# Patient Record
Sex: FEMALE | Race: OTHER | HISPANIC OR LATINO
[De-identification: names, ages, dates, MRNs, and addresses within clinical notes are randomized per-mention and may not be internally consistent; named-entity substitution may affect disease eponyms.]

---

## 2021-12-02 ENCOUNTER — APPOINTMENT (OUTPATIENT)
Dept: OBGYN | Facility: CLINIC | Age: 36
End: 2021-12-02

## 2022-05-02 ENCOUNTER — EMERGENCY (EMERGENCY)
Facility: HOSPITAL | Age: 37
LOS: 1 days | Discharge: HOME | End: 2022-05-02
Admitting: EMERGENCY MEDICINE
Payer: COMMERCIAL

## 2022-05-02 VITALS
OXYGEN SATURATION: 100 % | TEMPERATURE: 98 F | RESPIRATION RATE: 18 BRPM | HEART RATE: 88 BPM | DIASTOLIC BLOOD PRESSURE: 103 MMHG | SYSTOLIC BLOOD PRESSURE: 150 MMHG

## 2022-05-02 DIAGNOSIS — F43.21 ADJUSTMENT DISORDER WITH DEPRESSED MOOD: ICD-10-CM

## 2022-05-02 PROCEDURE — 99284 EMERGENCY DEPT VISIT MOD MDM: CPT
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
## 2022-05-02 NOTE — ED BEHAVIORAL HEALTH NOTE - BEHAVIORAL HEALTH NOTE
Writer met with patient in [facility] to obtain collateral contact. Patient shared the phone number to her father Pete Reyes ([phone]). Writer contacted the patient’s father with use of  (ID#[id number]). The following information is per patient’s father.    Patient is a 35 YO female domiciled with her 2 children (11 & 7), patient’s father and the father’s wife. As per father patient was on the phone with her therapist when the therapist contacted EMS. Patient attends therapy weekly. The father noticed the patient throwing stuff in the room, but nothing was broken. Father reports the patient locked herself in the room over the weekend and appeared depressed. Patient’s sleep and appetite have been poor. Patient’s hygiene is normal as per father. Father reports no safety concerns for the children and that the patient is able to care for the children. Stressors include problems with the children’s fathers and the oldest daughter acting like a rebel. Father reports the patient took extra of her medication last night but does not believe it was a suicide attempt.  Father denied any drug or alcohol abuse by the patient. Father says patient has one prior suicide attempt 20 years ago by trying to cut herself. Father reports the patient works at a [employer] for [employer] and has been going to work as scheduled. Medical problems include a hx of migraines. Father is hopeful for the patient to be evaluated. No further safety concerns reported for the patient.    Writer met with patient in [facility] to discuss discharge transportation. Patient's insurance is private and would not be eligible for medicaid taxi. Patient reports she is comfortable scheduling an uber for herself and that her aunt from [place] is on the way. Writer inquired about any additional services needed but the patient denied. 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 No further intervention needed.

## 2022-05-02 NOTE — ED PROVIDER NOTE - PATIENT PORTAL LINK FT
You can access the FollowMyHealth Patient Portal offered by [facility] by registering at the following website: [email or web address]. By joining [facility]’s FollowMyHealth portal, you will also be able to view your health information using other applications (apps) compatible with our system.

## 2022-05-02 NOTE — ED BEHAVIORAL HEALTH ASSESSMENT NOTE - SAFETY PLAN ADDT'L DETAILS
Safety plan discussed with.../Education provided regarding environmental safety / lethal means restriction/Provision of National Suicide Prevention Lifeline 2-871-111-ADFI (3877)

## 2022-05-02 NOTE — ED ADULT NURSE NOTE - OBJECTIVE STATEMENT
Pt w/ hx of depression not currently on medications received to  endorsing suicidal ideation without plan.  Pt reports she has a remote history of SI and has not been suicidal for years, however cites recent financial struggles and 11 year old daughter's behavior as stressors stating "she put me over the edge last night."  Pt denies any physical complaints.  Pt p/w NAD breaths even unlabored skin warm dry intact.  belongings checked and secured in  locker 6 per policy

## 2022-05-02 NOTE — ED BEHAVIORAL HEALTH ASSESSMENT NOTE - MODIFICATIONS
I have personally seen and examined this patient with LINSEY Perez. I have fully participated in the care of this patient. I have made amendments to the documentation where appropriate and otherwise agree with the history, mental status exam, and plan as documented by LINSEY Perez

## 2022-05-02 NOTE — ED ADULT TRIAGE NOTE - CHIEF COMPLAINT QUOTE
Pt reporting to the ED for S/I, pmh of depression, not taking medication at this time. . Reports taking sleeping pills quetiapine 50 mg X3 last night, stating "I wish I just never woke up". Pt reporting S/I with no plan, H/I (daughter). No Drug or ETOH use, no auditory or visual hallucinations.

## 2022-05-02 NOTE — ED BEHAVIORAL HEALTH ASSESSMENT NOTE - NSBHROSSYSTEMS_PSY_ALL_CORE
Pt currently denied any headaches, no dizziness, no blurring of vision; no sorethroat; no cough, no fever. no chest pains, no SOB, no palpitations, no abdominal pains, no nausea/ vomiting/ diarrhea, no dysuria, no hesitancy, no arthralgia/ no pruritus. denied any muscle/ joint pains

## 2022-05-02 NOTE — ED BEHAVIORAL HEALTH ASSESSMENT NOTE - DETAILS
hx of NSSIB, past SA via OD in 2012, denies current active intent/plan 11 and 5 y/o daughters Left message for therapist Laura Michael for call back see hpi see  safety plan
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
## 2022-05-02 NOTE — ED BEHAVIORAL HEALTH ASSESSMENT NOTE - RISK ASSESSMENT
Acute risk factors for SI includes depressed mood, past SA/NSSIB.   However she has multiple protective factors which mitigate her risk including no current SIIP/HIIP, no marilu or psychosis, no substance use, good insight, future oriented, engaged in treatment, wants to get better. She is not at acute risk to self or others. Her needs would best be met with outgoing outpatient care. Moderate Acute Suicide Risk Low Acute Suicide Risk

## 2022-05-02 NOTE — ED BEHAVIORAL HEALTH ASSESSMENT NOTE - REFERRAL / APPOINTMENT DETAILS
Will f/u with Laura Michael and consider increasing sessions 2x/week. Has scheduled appointment with psych NP on 5/6/22

## 2022-05-02 NOTE — ED PROVIDER NOTE - CLINICAL SUMMARY MEDICAL DECISION MAKING FREE TEXT BOX
35 y/o female depression c/o suicidal ideation and harmful thoughts against daughter  -psych consult  -dispo as per psychiatry team

## 2022-05-02 NOTE — ED BEHAVIORAL HEALTH ASSESSMENT NOTE - DESCRIPTION
Dysphoric appearing, calm. cooperative.  Vital Signs Last 24 Hrs  T(C): 36.6 (02 May 2022 16:47), Max: 36.6 (02 May 2022 16:47)  T(F): 97.8 (02 May 2022 16:47), Max: 97.8 (02 May 2022 16:47)  HR: 88 (02 May 2022 16:47) (88 - 88)  BP: 150/103 (02 May 2022 16:47) (150/103 - 150/103)  BP(mean): --  RR: 18 (02 May 2022 16:47) (18 - 18)  SpO2: 100% (02 May 2022 16:47) (100% - 100%) see hpi migraines

## 2022-05-02 NOTE — ED PROVIDER NOTE - NSFOLLOWUPINSTRUCTIONS_ED_ALL_ED_FT
You have been given information necessary to follow up with the  [facility] ([facility]) Crisis center & other outpatient  psychiatric clinics within your community    • [facility] walk in Crisis centre  [address] [place] [address] [phone] [email or web address]  Hours of operation:  Day	                                        Hours  Sunday                                  Closed  Monday                                9am - 3pm  Tuesday                                9am - 3pm  Wednesday                          9am - 3pm  Thursday                               9am - 3pm  Friday                                    9am - 3pm  Saturday                                Closed    .....additionally if your current problem is associated with drug or alcohol abuse further information can be obtained at the Drug Abuse Evaluation Health Referral Servce ([facility])    • [facility] [address] [place] [address] [phone] [email or web address]    Additionally if more support and information and help is needed in the area of suicide prevention pleas3 feel free to contact :   • Suicide Prevention [place] [address]  Phone: 4-599-944-EUWM (0710)  Web Address: http://www.suicidepreventionlifeline.org  • Suicide Awareness Voices of Education  [address]  [place], [place]  Phone: 1-805.493.1223  Web Address: http://www.save.org    Depression    WHAT YOU NEED TO KNOW:  Depression is a medical condition that causes feelings of sadness or hopelessness that do not go away. Depression may cause you to lose interest in things you used to enjoy. These feelings may interfere with your daily life.  DISCHARGE INSTRUCTIONS:  Call your local emergency number (911 in the [place]) if:   •You think about harming yourself or someone else.  •You have done something on purpose to hurt yourself.  Call your therapist or doctor if:   •Your symptoms do not improve.  •You cannot make it to your next appointment.   •You have new symptoms.  •You have questions or concerns about your condition or care.  The following resources are available at any time to help you, if needed:   •National Suicide Prevention Lifeline: 1-904.228.6330 (9-621-037-TALK)  •Suicide Hotline: 1-325.918.3918 (1-805-BCISTGL)  •For a list of international numbers: https://save.org/find-help/international-resources/  Medicines:   •Antidepressants may be given to improve or balance your mood. You may need to take this medicine for several weeks before you begin to feel better.  •Take your medicine as directed. Contact your healthcare provider if you think your medicine is not helping or if you have side effects. Tell him of her if you are allergic to any medicine. Keep a list of the medicines, vitamins, and herbs you take. Include the amounts, and when and why you take them. Bring the list or the pill bottles to follow-up visits. Carry your medicine list with you in case of an emergency.  Therapy is often used together with medicine to relieve depression. Therapy is a way for you to talk about your feelings and anything that may be causing depression. Therapy can be done alone or in a group. It may also be done with family members or a significant other.    Self-care:   •Get regular physical activity. Try to be active for 30 minutes, 3 to 5 days a week. Physical activity can help relieve depression. Work with your healthcare provider to develop a plan that you enjoy. It may help to ask someone to be active with you.  •Create a regular sleep schedule. A routine can help you relax before bed. Listen to music, read, or do yoga. Try to go to bed and wake up at the same time every day. Sleep is important for emotional health.  •Eat a variety of healthy foods. Healthy foods include fruits, vegetables, whole-grain breads, low-fat dairy products, lean meats, fish, and cooked beans. A healthy meal plan is low in fat, salt, and added sugar.  •Do not drink alcohol or use drugs. Alcohol and drugs can make depression worse. Talk to your therapist or doctor if you need help quitting.  Follow up with your healthcare provider as directed: Your healthcare provider will monitor your progress at follow-up visits. He or she will also monitor your medicine if you take antidepressants. Your healthcare provider will ask if the medicine is helping. Tell him or her about any side effects or problems you may have with your medicine. The type or amount of medicine may need to be changed. Write down your questions so you remember to ask them during your visits.    Suicide Prevention  WHAT YOU NEED TO KNOW:  You may see suicide as the only way to escape emotional or physical pain and suffering. Help is available from people who care about you, and from professionals trained in suicide prevention. Prevention includes everything you and others can do to stop you from taking your life.  DISCHARGE INSTRUCTIONS:  Call your local emergency number (911 in the ), or ask someone to call if:   •You do something on purpose to hurt yourself  •You make a plan to commit suicide.  Call your doctor or therapist, or have someone close to you call if:   •You act out in anger, are reckless, or are abusing alcohol or drugs.  •You have serious thoughts of suicide, even with treatment.  •You have more thoughts of suicide when you are alone.  •You stop eating, or you begin to smoke cigarettes or drink alcohol.  •You have questions or concerns about your condition or care.  What to do if you are considering suicide:    •Contact a suicide prevention organization. The following are always available to help you: ?National Suicide Prevention Lifeline: 1-968.815.2553 (9-021-073-TALK)  ?Suicide Hotline: 1-759.528.7872 (4-949-PVYCQDA)  ?For a list of international numbers: https://save.org/find-help/international-resources/  •Contact your therapist. Your doctor can give you a list of therapists if you do not have one.  •Keep medicines, weapons, and alcohol out of your home.  •Do not spend time alone if you have thoughts of ending your life.  Warning signs of suicide: The following can help you and others recognize that you are struggling:   •Talking about your plan for committing suicide, or wanting to read or write about death or suicide  •Cutting yourself, burning your skin with cigarettes, or driving recklessly  •Drug or alcohol use, not taking your prescribed medicine, or taking too much  •Not wanting to spend time with others or doing things you enjoy, feeling bored, or not wanting anyone to praise you  •Changes in your appetite, sleep habits, energy levels, or weight  •Feeling angry, or lashing out at others  •A need to give away or throw away your belonging  •Often skipping work  •Suddenly not taking medicine for a mental illness without talking to your healthcare provider  •Suddenly not going to therapy  Treatment for suicidal thoughts:   •Medicines may be given to prevent mood swings, or to decrease anxiety or depression. You will need to take all medicines as directed. A sudden stop can be harmful. It may take 4 to 6 weeks for the medicine to help you feel better.  •Suicide risk assessment means healthcare providers will ask questions about your suicide thoughts and plans. They will ask how often you think about suicide and if you have tried it before. They will ask if you have begun to hurt yourself, such as with cutting or reckless driving. They may ask if you have access to weapons or drugs.  •A safety plan includes a list of people or groups to contact if you have suicidal feelings again. The list may include friends, family members, a spiritual leader, and others you trust. You may be asked to make a verbal agreement or sign a contract that you will not try to harm yourself.  •A therapist can help you identify and change negative feelings or beliefs about yourself. This may help change the way you feel and act. A therapist can also help you find ways to cope with things that cannot be changed.  Manage depression:   •Get help for drug or alcohol abuse. Drugs and alcohol can make suicidal feelings worse and make you more likely to act on them. Drugs and alcohol can also cause or increase depression.  •Talk to someone you trust. Be honest about your thoughts and feelings about suicide. You can call a suicide prevention center if you do not want to talk to someone you know.  •Exercise as directed. Exercise can lift your mood, give you more energy, and make it easier to sleep.   •Eat a variety of healthy foods. Healthy foods include fruits, vegetables, whole-grain breads, lean meats, fish, low-fat dairy products, and beans. Try to eat regularly even if you do not feel hungry. Depression can increase from a lack of nutrition or if you are hungry for long periods of time.  •Create a sleep routine. Try to go to bed and wake up at the same time every day. Let your healthcare provider know if you are having trouble sleeping.  •Take your medicine and go to therapy as directed. Medicine and therapy can help you manage your mental health. Do not stop taking your medicine without talking to your healthcare provider. If you do not like the way a medicine makes you feel, you may be able to try a different medicine.    **Follow up with your doctor or therapist as directed: Write down your questions so you remember to ask them during your visits.**    Se le ha proporcionado la información necesaria para realizar un seguimiento con el centro de crisis del [facility] ([facility]) y otras clínicas psiquiátricas ambulatorias dentro de banegas comunidad.    • [facility] camina en el centro de crisis [address] [place] [address] [phone] [email or web address]  Horas de operación:  Horas del día  Cristo cerrado  Lunes 9 am - 3 pm  Dinesh 9am - 3pm  Miércoles 9 am - 3 pm  Jueves 9 am - 3 pm  Viernes 9am - 3pm  Sábado cerrado    ..... además, si banegas problema actual está asociado con el abuso de drogas o alcohol, se puede obtener más información en el Servicio de Referencia de [place] de Evaluación de Abuso de Drogas ([facility])    • [facility] [address] [place] [address] [phone] [email or web address]-[email or web address]    Además, si se necesita más apoyo, información y ayuda en el área de la prevención del suicidio, por favor, no dude en comunicarse con:  • Línea directa para la prevención del [place] [address]  Teléfono: [phone] ([phone])  Dirección web: http://www.suicidepreventionlifeline.org  • Voces de educación de concienciación sobre el suicidio  [address]  [place], [place] [address]  Teléfono: [phone]  Dirección web: http://www.save.org    La depresión    LO QUE NECESITA SABER:  La depresión es un trastorno médico que causa sentimientos de tristeza o desesperanza que no desaparecen. La depresión puede causar que usted pierda interés en las cosas que antes disfrutaba. Estos sentimientos pueden llegar a interferir con banegas олег diaria.  INSTRUCCIONES SOBRE EL NASIR HOSPITALARIA:  Llame al número de emergencias local (911 en los [place]) si:  •Usted piensa en lastimarse o lastimar a alguien más.  •Usted ha hecho algo a propósito para hacerse daño.  Llame a banegas terapeuta o médico si:  •Zoë síntomas no mejoran.  •No puede asistir a banegas próxima maría elena.  •Usted tiene síntomas nuevos.  •Usted tiene preguntas o inquietudes acerca de banegas condición o cuidado.  Los siguientes recursos están disponibles en cualquier momento para ayudarlo, si es necesario:  •National Suicide Prevention Lifeline (línea de prevención del suicidio): 1-706.556.5668 (7-860-818-TALK)  •Teléfono directo para hablar de suicidio: 1-972.393.7910 (3-827-IOXUPAR)  •Para obtener cristhian lista de números internacionales: https://save.org/find-help/international-resources/  Medicamentos:  •Antidepresivospueden darse para mejorar o balancear banegas estado de ánimo. Es posible que usted necesite fabby lavinia medicamento por varias semanas antes de empezar a sentirse mejor.  •[place] zoë medicamentos josefa se le haya indicado.Consulte con banegas médico si usted vesna que banegas medicamento no le está ayudando o si presenta efectos secundarios. Infórmele si es alérgico a algún medicamento. Mantenga cristhian lista actualizada de los medicamentos, las vitaminas y los productos herbales que amita. Incluya los siguientes datos de los medicamentos: cantidad, frecuencia y motivo de administración. Traiga con usted la lista o los envases de las píldoras a zoë citas de seguimiento. Lleve la lista de los medicamentos con usted en natalie de cristhian emergencia.  La terapiase utiliza a menudo junto con medicamentos para aliviar la depresión. La terapia es un lugar para hablar sobre zoë sentimientos y todo lo que puede estar causando la depresión. La terapia se puede realizar jv o en claude. También podría realizarse con zoë familiares o con banegas archie.  Cuidados personales:  •Realice actividad física regularmente.Trate de mantenerse activo por 30 minutos, de 3 a 5 días a la semana. La actividad física puede ayudar a aliviar la depresión. Colabore con banegas médico para crear un plan de ejercicios que usted disfrute. Puede ayudarlo si le pide a alguien que se mantenga activo con usted.  •Establezca un horario regular para dormir.Cristhian rutina puede ayudarlo a relajarse antes de irse a dormir. Escuche música, beatriz o devin yoga. Trate de irse a dormir y despertarse al mismo tiempo todos los días. El sueño es importante para la hollie emocional.  •Consuma alimentos saludables y variados.Los alimentos saludables incluyen frutas, verduras, panes integrales, productos lácteos descremados, mario magras, pescado y fríjoles. Un plan alimenticio saludable es bajo en grasas, sal y azúcar adicional.  •No tome alcohol ni use drogas.El alcohol y las drogas pueden empeorar la depresión. Hable con banegas terapeuta o médico si usted necesita ayuda para dejar de fumar.  Acuda a zoë consultas de control con banegas médico según le indicaron.Banegas médico vigilará banegas progreso en las citas de seguimiento. También monitoreará banegas medicamento si usted está tomando antidepresivos. Banegas médico le preguntará si el medicamento le está ayudando. Dígale sobre cualquier efecto secundario o problemas que usted tenga con banegas medicamento. Podría ser necesario cambiar el tipo o cantidad de medicamento. [place] zoë preguntas para que se acuerde de hacerlas soledad zoë visitas.    Prevención del suicidio    LO QUE NECESITA SABER:    Es probable que usted arie el suicidio josefa la única forma de escapar del dolor y el sufrimiento emocional o físico. Hay ayuda disponible de personas que lo quieren y de profesionales capacitados en prevención del suicidio. La prevención incluye todo lo que usted y otras personas pueden hacer para evitar que usted se quite banegas propia олег.  INSTRUCCIONES SOBRE EL NASIR HOSPITALARIA:  Llame al número local de emergencias (911 en los [place]), o pídale a alguien que llame si:  •Usted carballo hecho algo a propósito para hacerse daño a sí mismo.  •Usted elabora un plan para suicidarse.  Llame a banegas médico o terapeuta, o pídale a alguien cercano que llame si:  •Usted actúa muy enfadado, imprudente o abusa de las drogas o el alcohol.  •Usted piensa seriamente en el suicidio, aun estando en tratamiento.  •Usted tiene más pensamientos suicidas cuando está solo.  •Usted juan miguel de comer, o empieza a fumar cigarrillos o fabby alcohol.  •Usted tiene preguntas o inquietudes acerca de banegas condición o cuidado.  Qué hacer si usted está considerando el suicidio:  •Comuníquese con cristhian organización de prevención del suicidio. Las siguientes organizaciones están siempre disponibles para ayudarlo:?National Suicide Prevention Lifeline (línea de prevención del suicidio): 0-095-819-9119 (8-467-175-TALK)  ?Teléfono directo para hablar de suicidio: 0-055-945-3009 (2-657-JPTSUJX)  ?Para obtener cristhian lista de números internacionales: https://save.org/find-help/international-resources/  •Comuníquese con banegas terapeuta. Banegas médico puede darle cristhian lista de terapeutas si no tiene cristopher.  •No guarde medicamentos, aaron y bebidas alcohólicas en banegas hogar.  •No pase tiempo a solas si tiene pensamientos de terminar con banegas олег.  Signos de alerta de suicidio:Los siguientes pueden ayudarle a usted y a otras personas a reconocer banegas aria:   •Habla de banegas plan para suicidarse o quiere leer o escribir acerca de la muerte o el suicidio  •Se corta, se quema la piel con cigarrillos o maneja de forma imprudente  •Consume drogas o alcohol, no amita zoë medicamentos recetados o amita cristhian cantidad mayor que la recetada  •No quiere pasar tiempo con otros o hacer cosas que usted disfruta, se siente aburrido o no quiere que nadie lo elogie  •Cambios en banegas apetito, hábitos para dormir, niveles de energía o peso  •Se siente enojado o se enfurece de repente con otras personas  •Tiene la necesidad de regalar o tirar zoë pertenencias  •Falta a menudo al trabajo  •De repente juan miguel de fabby el medicamento para cristhian enfermedad mental sin consultar a banegas médico  •De repente no va a terapia  Tratamiento para los pensamientos suicidas:  •Los medicamentosse pueden recetar para prevenir los cambios de humor o disminuir la ansiedad o la depresión. Usted tendrá que tomarse todos zoë medicamentos según las indicaciones. Suspender el medicamento de repente puede ser perjudicial. Pueden pasar de 4 a 6 semanas para que los medicamentos le ayuden a sentirse mejor.  •Cristhian evaluación del riesgo del suicidiosignifica que los médicos le harán preguntas sobre zoë pensamientos y planes de suicidio. Le preguntarán la frecuencia con la que usted piensa sobre el suicidio y si usted lo ha intentado antes. Le preguntarán si usted ha empezado a lastimarse a sí mismo, josefa cortarse o manejar sin control. Puede que le pregunten si usted tiene acceso a aaron o drogas.  •Un plan de seguridadincluye cristhian lista de personas o grupos para contactar si usted vuelve a tener pensamientos suicidas. La lista puede incluir amigos, familiares, un líder espiritual y otras personas en las que usted confía. Es probable que le pidan que devin un pacto verbal o firme un contrato donde usted afirme que no tratará de lastimarse.  •Un terapeutapuede ayudarlo a identificar y cambiar los sentimientos o creencias negativos acerca de sí mismo. [place] puede ayudar a cambiar la forma en la que se siente y actúa. Un terapeuta también puede ayudarlo a encontrar formas de afrontar las cosas que no se pueden cambiar.    Maneje la depresión:  •Consiga ayuda para el abuso de las drogas o el alcohol.Las drogas y el alcohol pueden empeorar los pensamientos suicidas y aumentar las posibilidades de que usted devin realidad esos pensamientos. Las drogas y el alcohol también pueden causar o aumentar la depresión.  •Hable con alguien en quien usted confíe.Sea honesto con zoë pensamientos y sentimientos sobre el suicidio. Usted puede llamar a un centro de prevención del suicidio si prefiere no hablar con alguien a quien conozca.  •Ejercítese según indicaciones.El ejercicio puede levantarle el ánimo, darle más energía y ayudarle a dormir.  •Consuma alimentos saludables y variados.Los alimentos sanos incluyen frutas, vegetales, panes integrales, mario magras, pescado, productos lácteos bajos en grasas y frijoles. Trate de comer regularmente aunque no sienta hambre. La depresión puede aumentar debido a cristhian falta de nutrición o si usted se pasa con hambre por largos periodos de tiempo.  •Vesna cristhian rutina para dormir.Trate de irse a dormir y despertarse al mismo tiempo todos los días. Informe a banegas médico si tiene dificultad para dormir.  •[place] los medicamentos y vaya a terapia josefa se le indicó.Los medicamentos y la terapia pueden ayudarlo a controlar banegas hollie mental. No suspenda los medicamentos sin antes consultar con banegas médico. Si no le gusta la forma en que un medicamento lo hace sentir, es posible que pueda probar un medicamento diferente.  Para apoyo y más información:    Acuda a zoë consultas de control con banegas médico o terapeuta según le indicaron:Anote zoë preguntas para que se acuerde de hacerlas soledad zoë visitas.

## 2022-05-02 NOTE — ED PROVIDER NOTE - OBJECTIVE STATEMENT
37 y/o female hx depression (not currently on medications) presents to ER c/o suicidal ideation. Pt.  has been feeling worsening depression stress at home due to multiple stressors including her 12 y/o daughter who is "very difficult" at home. Pt.  has been suicidal recently which has worsened over past few days -  took 3 x 50mg quetiapine tablets last night to "pass out" but admits that she knows it would not hurt her and if she wanted to "I could have taken the whole bottle". Pt. also endorse passive aggression and harmful thoughts against her daughter who has been upsetting her -  thought of harm but has not hit/harmed daughter in any capacity ever. Pt.  speaks with a therapist once a week and called them today and was told to come to ER for further evaluation.   Pt. denies any physical complaints at this time.   Denies previous suicide attempts in the past. Denies alcohol or recreation drug use/abuse.

## 2023-01-28 NOTE — ED BEHAVIORAL HEALTH NOTE - BEHAVIORAL HEALTH NOTE
High risk log: Writer spoke to patient. Pt states she has a therapist appt today and psychiatrist may 6.  Pt was concerned if an ACS case had been opened against her by evaluating NP. I, Sara Mcdonough, performed a face to face bedside interview with this patient regarding history of present illness, review of symptoms and relevant past medical, social and family history.  I completed an independent physical examination. Medical decision making, follow-up on ordered tests (ie labs, radiologic studies) and re-evaluation of the patient's status has been communicated to the ACP.  Disposition of the patient will be based on test outcome and response to ED interventions.

## 2024-10-04 NOTE — ED PROVIDER NOTE - TEMPLATE, MLM
[Time Spent: ___ minutes] : I have spent [unfilled] minutes of time on the encounter which excludes teaching and separately reported services. VIEW ALL